# Patient Record
Sex: MALE | Race: WHITE | NOT HISPANIC OR LATINO | URBAN - METROPOLITAN AREA
[De-identification: names, ages, dates, MRNs, and addresses within clinical notes are randomized per-mention and may not be internally consistent; named-entity substitution may affect disease eponyms.]

---

## 2020-09-18 ENCOUNTER — EMERGENCY (EMERGENCY)
Facility: HOSPITAL | Age: 30
LOS: 0 days | Discharge: HOME | End: 2020-09-19
Attending: EMERGENCY MEDICINE | Admitting: EMERGENCY MEDICINE
Payer: COMMERCIAL

## 2020-09-18 VITALS
OXYGEN SATURATION: 98 % | RESPIRATION RATE: 18 BRPM | HEART RATE: 72 BPM | DIASTOLIC BLOOD PRESSURE: 83 MMHG | TEMPERATURE: 98 F | SYSTOLIC BLOOD PRESSURE: 142 MMHG

## 2020-09-18 DIAGNOSIS — R07.0 PAIN IN THROAT: ICD-10-CM

## 2020-09-18 DIAGNOSIS — H92.01 OTALGIA, RIGHT EAR: ICD-10-CM

## 2020-09-18 DIAGNOSIS — H92.09 OTALGIA, UNSPECIFIED EAR: ICD-10-CM

## 2020-09-18 DIAGNOSIS — F17.200 NICOTINE DEPENDENCE, UNSPECIFIED, UNCOMPLICATED: ICD-10-CM

## 2020-09-18 DIAGNOSIS — K08.89 OTHER SPECIFIED DISORDERS OF TEETH AND SUPPORTING STRUCTURES: ICD-10-CM

## 2020-09-18 DIAGNOSIS — R51 HEADACHE: ICD-10-CM

## 2020-09-18 DIAGNOSIS — M54.2 CERVICALGIA: ICD-10-CM

## 2020-09-18 LAB
ANION GAP SERPL CALC-SCNC: 12 MMOL/L — SIGNIFICANT CHANGE UP (ref 7–14)
BASOPHILS # BLD AUTO: 0.06 K/UL — SIGNIFICANT CHANGE UP (ref 0–0.2)
BASOPHILS NFR BLD AUTO: 0.9 % — SIGNIFICANT CHANGE UP (ref 0–1)
BUN SERPL-MCNC: 11 MG/DL — SIGNIFICANT CHANGE UP (ref 10–20)
CALCIUM SERPL-MCNC: 9.9 MG/DL — SIGNIFICANT CHANGE UP (ref 8.5–10.1)
CHLORIDE SERPL-SCNC: 96 MMOL/L — LOW (ref 98–110)
CO2 SERPL-SCNC: 27 MMOL/L — SIGNIFICANT CHANGE UP (ref 17–32)
CREAT SERPL-MCNC: 1 MG/DL — SIGNIFICANT CHANGE UP (ref 0.7–1.5)
EOSINOPHIL # BLD AUTO: 0.25 K/UL — SIGNIFICANT CHANGE UP (ref 0–0.7)
EOSINOPHIL NFR BLD AUTO: 3.9 % — SIGNIFICANT CHANGE UP (ref 0–8)
GLUCOSE SERPL-MCNC: 109 MG/DL — HIGH (ref 70–99)
HCT VFR BLD CALC: 46.8 % — SIGNIFICANT CHANGE UP (ref 42–52)
HGB BLD-MCNC: 15.7 G/DL — SIGNIFICANT CHANGE UP (ref 14–18)
IMM GRANULOCYTES NFR BLD AUTO: 0.3 % — SIGNIFICANT CHANGE UP (ref 0.1–0.3)
LYMPHOCYTES # BLD AUTO: 1.89 K/UL — SIGNIFICANT CHANGE UP (ref 1.2–3.4)
LYMPHOCYTES # BLD AUTO: 29.4 % — SIGNIFICANT CHANGE UP (ref 20.5–51.1)
MCHC RBC-ENTMCNC: 29.6 PG — SIGNIFICANT CHANGE UP (ref 27–31)
MCHC RBC-ENTMCNC: 33.5 G/DL — SIGNIFICANT CHANGE UP (ref 32–37)
MCV RBC AUTO: 88.1 FL — SIGNIFICANT CHANGE UP (ref 80–94)
MONOCYTES # BLD AUTO: 0.58 K/UL — SIGNIFICANT CHANGE UP (ref 0.1–0.6)
MONOCYTES NFR BLD AUTO: 9 % — SIGNIFICANT CHANGE UP (ref 1.7–9.3)
NEUTROPHILS # BLD AUTO: 3.63 K/UL — SIGNIFICANT CHANGE UP (ref 1.4–6.5)
NEUTROPHILS NFR BLD AUTO: 56.5 % — SIGNIFICANT CHANGE UP (ref 42.2–75.2)
NRBC # BLD: 0 /100 WBCS — SIGNIFICANT CHANGE UP (ref 0–0)
PLATELET # BLD AUTO: 286 K/UL — SIGNIFICANT CHANGE UP (ref 130–400)
POTASSIUM SERPL-MCNC: 3.7 MMOL/L — SIGNIFICANT CHANGE UP (ref 3.5–5)
POTASSIUM SERPL-SCNC: 3.7 MMOL/L — SIGNIFICANT CHANGE UP (ref 3.5–5)
RBC # BLD: 5.31 M/UL — SIGNIFICANT CHANGE UP (ref 4.7–6.1)
RBC # FLD: 12.2 % — SIGNIFICANT CHANGE UP (ref 11.5–14.5)
SODIUM SERPL-SCNC: 135 MMOL/L — SIGNIFICANT CHANGE UP (ref 135–146)
WBC # BLD: 6.43 K/UL — SIGNIFICANT CHANGE UP (ref 4.8–10.8)
WBC # FLD AUTO: 6.43 K/UL — SIGNIFICANT CHANGE UP (ref 4.8–10.8)

## 2020-09-18 PROCEDURE — 99285 EMERGENCY DEPT VISIT HI MDM: CPT

## 2020-09-18 PROCEDURE — 70491 CT SOFT TISSUE NECK W/DYE: CPT | Mod: 26

## 2020-09-18 RX ORDER — ACETAMINOPHEN 500 MG
975 TABLET ORAL ONCE
Refills: 0 | Status: COMPLETED | OUTPATIENT
Start: 2020-09-18 | End: 2020-09-18

## 2020-09-18 RX ADMIN — Medication 975 MILLIGRAM(S): at 20:14

## 2020-09-18 RX ADMIN — Medication 975 MILLIGRAM(S): at 20:45

## 2020-09-18 NOTE — ED PROVIDER NOTE - NSFOLLOWUPINSTRUCTIONS_ED_ALL_ED_FT
Earache    WHAT YOU NEED TO KNOW:    An earache can be caused by a problem within your ear or from another body area. Common causes include earwax buildup, objects in your ear, injury, infections, or jaw or dental problems. Less often, earaches may be caused by arthritis in your upper spine.    DISCHARGE INSTRUCTIONS:    Return to the emergency department if:     You have a severe earache.      You have ear pain with itching, hearing loss, dizziness, a feeling of fullness in your ear, or ringing in your ears.    Contact your healthcare provider if:     Your ear pain worsens or does not go away with treatment.      You have drainage from your ear.      You have a fever.       Your outer ear becomes red, swollen, and warm.      You have questions or concerns about your condition or care.    Medicines: You may need any of the following:     NSAIDs, such as ibuprofen, help decrease swelling, pain, and fever. This medicine is available with or without a doctor's order. NSAIDs can cause stomach bleeding or kidney problems in certain people. If you take blood thinner medicine, always ask if NSAIDs are safe for you. Always read the medicine label and follow directions. Do not give these medicines to children under 6 months of age without direction from your child's healthcare provider.      Acetaminophen decreases pain and fever. It is available without a doctor's order. Ask how much to take and how often to take it. Follow directions. Acetaminophen can cause liver damage if not taken correctly.      Do not give aspirin to children under 18 years of age. Your child could develop Reye syndrome if he takes aspirin. Reye syndrome can cause life-threatening brain and liver damage. Check your child's medicine labels for aspirin, salicylates, or oil of wintergreen.       Take your medicine as directed. Call your healthcare provider if you think your medicine is not helping or if you have side effects. Tell him if you are allergic to any medicine. Keep a list of the medicines, vitamins, and herbs you take. Include the amounts, and when and why you take them. Bring the list or the pill bottles to follow-up visits. Carry your medicine list with you in case of an emergency.    Follow up with your healthcare provider as directed: Write down your questions so you remember to ask them during your visits.       © Copyright [employer] 2019 All illustrations and images included in CareNotes are the copyrighted property of [employer] or [employer].   Dental Pain    Dental pain (toothache) may be caused by many things including tooth decay (cavities or caries), abscess or infection, injury, or the reason may be unknown. Your pain may only occur when you are chewing, are exposed to hot or cold temperature, are eating or drinking sugary foods or beverages, or your pain may be constant. If you were prescribed an antibiotic medicine, finish all of it even if you start to feel better. Rinsing your mouth with salt water or applying ice to the painful area of your face may help with the pain.    SEEK IMMEDIATE MEDICAL CARE IF YOU HAVE THE FOLLOWING SYMPTOMS: unable to open mouth, trouble breathing or swallowing, fever, or swelling of the face, neck or jaw.

## 2020-09-18 NOTE — ED PROVIDER NOTE - NSFOLLOWUPCLINICS_GEN_ALL_ED_FT
[facility]  Dental  [address] [place] [address]  Phone: [phone]  Fax:   Follow Up Time:     [facility]  ENT  [address], [address]nd [place] [address]  Phone: [phone]  Fax:   Follow Up Time:

## 2020-09-18 NOTE — ED PROVIDER NOTE - PROGRESS NOTE DETAILS
s/o from [place] at 2200 pending CT read; awaiting final read for several hrs despite mult calls to rad; finally attending called  and said no change, but final read performed by neurorad in AM  Pt counseled - warning si/sxs d/w pt. Pt instructed to return to ed or f/u with PCP should sxs persist/worsen. Pt verbalizes an understanding & agreement with the plan as discussed

## 2020-09-18 NOTE — ED PROVIDER NOTE - NS ED ROS FT
Eyes:  No visual changes, eye pain or discharge.  ENMT:  + throat pain, No hearing changes, pain, discharge or infections. No neck pain or stiffness.  Cardiac:  No chest pain, SOB or edema. No chest pain with exertion.  Respiratory:  No cough or respiratory distress. No hemoptysis. No history of asthma or RAD.  GI:  No nausea, vomiting, diarrhea or abdominal pain.  MS:  No myalgia, muscle weakness, joint pain or back pain.  Neuro:  No headache or weakness.  No LOC.  Skin:  No skin rash.   Endocrine: No history of thyroid disease or diabetes.  Except as documented in the HPI,  all other systems are negative.

## 2020-09-18 NOTE — ED PROVIDER NOTE - ATTENDING CONTRIBUTION TO CARE
29 yr old m w/ a pmh significant for smoker who presents today with neck pain. Pt states that the pain started about a week ago. Pt states that the pain is mostly in the R upper jaw neck area. Pt states that he has been taking amoxicillin (from previous dental work) without minimal report. Pt denies any fevers, chills, nausea, vomiting, or any other complaints.     Review of Systems    Constitutional: (-) fever  Cardiovascular: (-) chest pain, (-) syncope  Respiratory: (-) cough, (-) shortness of breath  Gastrointestinal: (-) vomiting, (-) diarrhea, (-) abdominal pain  Musculoskeletal: (-) neck pain, (-) back pain, (-) joint pain  Integumentary: (-) rash, (-) edema  Neurological: (-) headache, (-) altered mental status    Except as documented in the HPI, all other systems are negative.    VITAL SIGNS: I have reviewed nursing notes and confirm.  CONSTITUTIONAL: non-toxic, well appearing  SKIN: no rash, no petechiae.  EYES: PERRL, EOMI, pink conjunctiva, anicteric  ENT: tongue midline, no exudates, MMM. bilaterally TM no erythema or bulging. there is pain on palpation of the R jaw.   NECK: Supple; no meningismus, there is swelling noted to the R neck, however, no swollen lymphadenopathy noted on exam.   CARD: RRR, no murmurs, equal radial pulses bilaterally 2+  RESP: CTAB, no respiratory distress  ABD: Soft, non-tender, non-distended, no peritoneal signs, no HSM, no CVA tenderness  : no hernias, no lesions, no testicular swelling/tenderness  EXT: Normal ROM x4. No edema. No calves tenderness  NEURO: Alert, oriented. CN2-12 intact, equal strength bilaterally, nl gait.  PSYCH: Cooperative, appropriate.    a/p  29 yr old m that presents with R sided neck and jaw pain, concern for possible abscess due to pts history of poor dentation  -labs  -imaging  -pain   -dispo as per above

## 2020-09-18 NOTE — ED ADULT NURSE NOTE - CAS EDN DISCHARGE ASSESSMENT
Problem: Infection  Goal: Will remain free from infection  Scheduled PO abx's given - see MAR     Problem: Venous Thromboembolism (VTW)/Deep Vein Thrombosis (DVT) Prevention:  Goal: Patient will participate in Venous Thrombosis (VTE)/Deep Vein Thrombosis (DVT)Prevention Measures    05/31/17 1930   OTHER   Risk Assessment Score 3   VTE RISK High   Pharmacologic Prophylaxis Used (PLAVIX)             
Problem: Pain Management  Goal: Pain level will decrease to patient’s comfort goal  Intervention: Educate and implement non-pharmacologic comfort measures. Examples: relaxation, distration, play therapy, activity therapy, massage, etc.  Pain tolerable,encourage alternative techniques to relive pain  Minimizing use of narcotics          
Problem: Safety  Goal: Will remain free from injury  Outcome: PROGRESSING AS EXPECTED  Bed in low position, treaded slipper socks on, and call light in reach. Pt instructed to call nurse if ambulating out of bed.     Problem: Knowledge Deficit  Goal: Knowledge of disease process/condition, treatment plan, diagnostic tests, and medications will improve  Outcome: PROGRESSING AS EXPECTED  Pt updated on POC (EGD in AM, lab results, medications, diet order, etc.). Pt and family verbalize understanding.         
Problem: Venous Thromboembolism (VTW)/Deep Vein Thrombosis (DVT) Prevention:  Goal: Patient will participate in Venous Thrombosis (VTE)/Deep Vein Thrombosis (DVT)Prevention Measures    05/30/17 2034   OTHER   Risk Assessment Score 3   VTE RISK Moderate   Pharmacologic Prophylaxis Used LMWH: Enoxaparin(Lovenox)         Problem: Pain Management  Goal: Pain level will decrease to patient’s comfort goal  PRN Oxy IR & dilaudid given - see MAR         
Alert and oriented to person, place and time

## 2020-09-18 NOTE — ED PROVIDER NOTE - OBJECTIVE STATEMENT
Pt is a 28y/o male presents today for eval of right sided neck pain x 1 week that has been constant, worsened with movement. Pt has been taking leftover abx from old dental infection in hopes it would relieve pain, but it hasn't. Pt denies fever, chills, dysphagia, n/v/d, CP, SOB, Abd pain, drooling.

## 2020-09-18 NOTE — ED PROVIDER NOTE - PHYSICAL EXAMINATION
VITAL SIGNS: I have reviewed nursing notes and confirm.  CONSTITUTIONAL: Well-developed; well-nourished; in no acute distress.   SKIN: skin exam is warm and dry, no acute rash.    HEAD: Normocephalic; atraumatic.  EYES:  conjunctiva and sclera clear.  ENT: no sublingual edema, No nasal discharge; airway clear.  NECK:TTP right submandibular region, no palpable nodes/edema Supple; non tender.  CARD: S1, S2 normal; no murmurs, gallops, or rubs. Regular rate and rhythm.   RESP: No wheezes, rales or rhonchi.  EXT: Normal ROM.  No clubbing, cyanosis or edema.   NEURO: Alert, oriented, grossly unremarkable

## 2020-09-18 NOTE — ED PROVIDER NOTE - PATIENT PORTAL LINK FT
You can access the FollowMyHealth Patient Portal offered by [facility] by registering at the following website: [email or web address]. By joining [employer]’s FollowMyHealth portal, you will also be able to view your health information using other applications (apps) compatible with our system.

## 2020-09-19 RX ORDER — IBUPROFEN 200 MG
800 TABLET ORAL ONCE
Refills: 0 | Status: DISCONTINUED | OUTPATIENT
Start: 2020-09-19 | End: 2020-09-19

## 2020-09-19 RX ORDER — IBUPROFEN 200 MG
1 TABLET ORAL
Qty: 21 | Refills: 0
Start: 2020-09-19 | End: 2020-09-25

## 2020-09-19 RX ORDER — AMOXICILLIN 250 MG/5ML
1 SUSPENSION, RECONSTITUTED, ORAL (ML) ORAL
Qty: 20 | Refills: 0
Start: 2020-09-19 | End: 2020-09-28

## 2022-04-06 NOTE — ED ADULT TRIAGE NOTE - BP NONINVASIVE DIASTOLIC (MM HG)
Pt daughter calling saying pt had uti sx. Has bloating and dysuria. Pt taking keflex 250mg every other day. Requesting abx tx for sx. TORB Dr Jodi Tobars macrobid 100mg po BID x 7d. 83

## 2025-01-14 ENCOUNTER — EMERGENCY (EMERGENCY)
Facility: HOSPITAL | Age: 35
LOS: 0 days | Discharge: HOME | End: 2025-01-14
Attending: EMERGENCY MEDICINE
Payer: COMMERCIAL

## 2025-01-14 VITALS
HEART RATE: 85 BPM | HEIGHT: 70 IN | WEIGHT: 205.03 LBS | RESPIRATION RATE: 16 BRPM | OXYGEN SATURATION: 99 % | SYSTOLIC BLOOD PRESSURE: 123 MMHG | DIASTOLIC BLOOD PRESSURE: 76 MMHG | TEMPERATURE: 100 F

## 2025-01-14 PROCEDURE — 99284 EMERGENCY DEPT VISIT MOD MDM: CPT

## 2025-01-14 PROCEDURE — 0241U: CPT

## 2025-01-14 PROCEDURE — 99283 EMERGENCY DEPT VISIT LOW MDM: CPT | Mod: 25

## 2025-01-14 PROCEDURE — 71046 X-RAY EXAM CHEST 2 VIEWS: CPT | Mod: 26

## 2025-01-14 PROCEDURE — 71046 X-RAY EXAM CHEST 2 VIEWS: CPT

## 2025-01-14 RX ORDER — IBUPROFEN 200 MG
600 TABLET ORAL ONCE
Refills: 0 | Status: COMPLETED | OUTPATIENT
Start: 2025-01-14 | End: 2025-01-14

## 2025-01-14 RX ORDER — ONDANSETRON 4 MG/1
1 TABLET ORAL
Qty: 1 | Refills: 0
Start: 2025-01-14 | End: 2025-01-16

## 2025-01-14 RX ORDER — ONDANSETRON 4 MG/1
4 TABLET ORAL ONCE
Refills: 0 | Status: COMPLETED | OUTPATIENT
Start: 2025-01-14 | End: 2025-01-14

## 2025-01-14 RX ADMIN — Medication 600 MILLIGRAM(S): at 18:34

## 2025-01-14 RX ADMIN — ONDANSETRON 4 MILLIGRAM(S): 4 TABLET ORAL at 19:44

## 2025-01-14 NOTE — ED PROVIDER NOTE - PATIENT PORTAL LINK FT
You can access the FollowMyHealth Patient Portal offered by [facility] by registering at the following website: [email or web address]. By joining [employer]’s FollowMyHealth portal, you will also be able to view your health information using other applications (apps) compatible with our system.

## 2025-01-14 NOTE — ED PROVIDER NOTE - CLINICAL SUMMARY MEDICAL DECISION MAKING FREE TEXT BOX
33-year-old man, no significant past medical history complains of generalized bodyaches, chills, cough that began today.  Has not taken any meds for this as symptoms just began today.  Vital signs, exam as noted, patient is nontoxic-appearing, afebrile.  Throat clear, lungs clear, CV S1-S2, abdomen soft, nontender.  Suspect viral illness.  Swab pending.  Recommended fluids, OTC meds, follow-up PMD.

## 2025-01-14 NOTE — ED PROVIDER NOTE - OBJECTIVE STATEMENT
34-year-old male with no significant history presents for evaluation of flulike symptoms.  Patient started getting symptoms of body pains today while at work.  He endorses chills starting, cough, but denies any fevers, sweats, sore throat, shortness of breath, chest pain, diarrhea, or urinary symptoms.  He has not taken anything for this yet.  Patient endorses nausea, but denies vomiting.

## 2025-01-14 NOTE — ED PROVIDER NOTE - PHYSICAL EXAMINATION
CONSTITUTIONAL: well-appearing, well nourished, non-toxic, NAD  HEAD: NCAT  EYES: EOMI, no scleral icterus  ENT: Moist mucous membranes, normal pharynx with no erythema or exudates  NECK: Full ROM.   CARD: RRR  RESP: clear to ausculation b/l  ABD: soft, non-tender  EXT: Full ROM  NEURO: normal motor. normal sensory. Normal gait.  PSYCH: Cooperative, appropriate.

## 2025-01-15 LAB
FLUAV AG NPH QL: SIGNIFICANT CHANGE UP
FLUBV AG NPH QL: SIGNIFICANT CHANGE UP
RSV RNA NPH QL NAA+NON-PROBE: SIGNIFICANT CHANGE UP
SARS-COV-2 RNA SPEC QL NAA+PROBE: SIGNIFICANT CHANGE UP

## 2025-05-08 ENCOUNTER — EMERGENCY (EMERGENCY)
Facility: HOSPITAL | Age: 35
LOS: 0 days | Discharge: HOME | End: 2025-05-08
Attending: EMERGENCY MEDICINE
Payer: COMMERCIAL

## 2025-05-08 VITALS
OXYGEN SATURATION: 96 % | DIASTOLIC BLOOD PRESSURE: 84 MMHG | HEART RATE: 93 BPM | HEIGHT: 70 IN | RESPIRATION RATE: 16 BRPM | TEMPERATURE: 98 F | WEIGHT: 190.04 LBS | SYSTOLIC BLOOD PRESSURE: 141 MMHG

## 2025-05-08 DIAGNOSIS — N50.819 TESTICULAR PAIN, UNSPECIFIED: ICD-10-CM

## 2025-05-08 DIAGNOSIS — R10.30 LOWER ABDOMINAL PAIN, UNSPECIFIED: ICD-10-CM

## 2025-05-08 DIAGNOSIS — F17.210 NICOTINE DEPENDENCE, CIGARETTES, UNCOMPLICATED: ICD-10-CM

## 2025-05-08 DIAGNOSIS — N49.2 INFLAMMATORY DISORDERS OF SCROTUM: ICD-10-CM

## 2025-05-08 DIAGNOSIS — N45.1 EPIDIDYMITIS: ICD-10-CM

## 2025-05-08 LAB
APPEARANCE UR: CLEAR — SIGNIFICANT CHANGE UP
BILIRUB UR-MCNC: NEGATIVE — SIGNIFICANT CHANGE UP
COLOR SPEC: YELLOW — SIGNIFICANT CHANGE UP
DIFF PNL FLD: NEGATIVE — SIGNIFICANT CHANGE UP
GLUCOSE UR QL: NEGATIVE MG/DL — SIGNIFICANT CHANGE UP
HIV 1 & 2 AB SERPL IA.RAPID: SIGNIFICANT CHANGE UP
KETONES UR-MCNC: NEGATIVE MG/DL — SIGNIFICANT CHANGE UP
LEUKOCYTE ESTERASE UR-ACNC: ABNORMAL
NITRITE UR-MCNC: NEGATIVE — SIGNIFICANT CHANGE UP
PH UR: 6 — SIGNIFICANT CHANGE UP (ref 5–8)
PROT UR-MCNC: NEGATIVE MG/DL — SIGNIFICANT CHANGE UP
SP GR SPEC: 1.01 — SIGNIFICANT CHANGE UP (ref 1–1.03)
SPECIMEN SOURCE: SIGNIFICANT CHANGE UP
UROBILINOGEN FLD QL: 0.2 MG/DL — SIGNIFICANT CHANGE UP (ref 0.2–1)

## 2025-05-08 PROCEDURE — 96372 THER/PROPH/DIAG INJ SC/IM: CPT

## 2025-05-08 PROCEDURE — 76870 US EXAM SCROTUM: CPT | Mod: 26

## 2025-05-08 PROCEDURE — 87491 CHLMYD TRACH DNA AMP PROBE: CPT

## 2025-05-08 PROCEDURE — 81001 URINALYSIS AUTO W/SCOPE: CPT

## 2025-05-08 PROCEDURE — 76870 US EXAM SCROTUM: CPT

## 2025-05-08 PROCEDURE — 36415 COLL VENOUS BLD VENIPUNCTURE: CPT

## 2025-05-08 PROCEDURE — 87591 N.GONORRHOEAE DNA AMP PROB: CPT

## 2025-05-08 PROCEDURE — 86703 HIV-1/HIV-2 1 RESULT ANTBDY: CPT

## 2025-05-08 PROCEDURE — 99284 EMERGENCY DEPT VISIT MOD MDM: CPT

## 2025-05-08 PROCEDURE — 99284 EMERGENCY DEPT VISIT MOD MDM: CPT | Mod: 25

## 2025-05-08 PROCEDURE — 86780 TREPONEMA PALLIDUM: CPT

## 2025-05-08 RX ORDER — KETOROLAC TROMETHAMINE 30 MG/ML
30 INJECTION, SOLUTION INTRAMUSCULAR; INTRAVENOUS ONCE
Refills: 0 | Status: DISCONTINUED | OUTPATIENT
Start: 2025-05-08 | End: 2025-05-08

## 2025-05-08 RX ORDER — NALTREXONE HYDROCHLORIDE 50 MG/1
1 TABLET, FILM COATED ORAL
Qty: 30 | Refills: 0
Start: 2025-05-08 | End: 2025-06-06

## 2025-05-08 RX ADMIN — KETOROLAC TROMETHAMINE 30 MILLIGRAM(S): 30 INJECTION, SOLUTION INTRAMUSCULAR; INTRAVENOUS at 12:55

## 2025-05-08 NOTE — ED PROVIDER NOTE - NSFOLLOWUPINSTRUCTIONS_ED_ALL_ED_FT
Thank you for letting us take care of you today.  Return to the Emergency Department if your symptoms worsen, or if any problems.    Continue taking the doxycycline that you were prescribed yesterday.    Take naltrexone as prescribed.  One of our Health Coaches will also call you to see how you are doing with the naltrexone (given a 30 day supply).    Take motrin or advil for your pain also.  You can also add Tylenol for additional pain relief.    Your diagnosis is: Epididymo-Orchitis / Epididymitis    AMBULATORY CARE:    Epididymo-orchitis is inflammation of your epididymis and testicle. The epididymis is a coiled tube inside your scrotum. It stores and carries sperm from your testicles to your penis. Epididymo-orchitis usually affects the epididymis and testicle on one side, but it may affect both sides.  Male Reproductive System    What are the signs and symptoms of epididymo-orchitis?    Pain or tenderness in your scrotum, abdomen, or groin    Redness or swelling of your scrotum    Pain or burning during urination, or frequent urination    Discharge from your penis or blood in your urine or semen    Fever  Seek care immediately if:    You have severe pain in your testicles.    Your symptoms become worse even after you start treatment with medicine.  Contact your healthcare provider if:    Your symptoms do not get better within 3 days of treatment or come back after treatment.    You have a hot, red, tender area on your testicles.    You have questions or concerns about your condition or care.  Treatment depends on the cause of your epididymo-orchitis and may include any of the following:    Antibiotics help treat a bacterial infection.    NSAIDs, such as ibuprofen, help decrease swelling, pain, and fever. This medicine is available with or without a doctor's order. NSAIDs can cause stomach bleeding or kidney problems in certain people. If you take blood thinner medicine, always ask if NSAIDs are safe for you. Always read the medicine label and follow directions. Do not give these medicines to children younger than 6 months without direction from a healthcare provider.    Acetaminophen decreases pain and fever. It is available without a doctor's order. Ask how much to take and how often to take it. Follow directions. Read the labels of all other medicines you are using to see if they also contain acetaminophen, or ask your doctor or pharmacist. Acetaminophen can cause liver damage if not taken correctly.    Prescription pain medicine may be given. Ask your healthcare provider how to take this medicine safely. Some prescription pain medicines contain acetaminophen. Do not take other medicines that contain acetaminophen without talking to your healthcare provider. Too much acetaminophen may cause liver damage. Prescription pain medicine may cause constipation. Ask your healthcare provider how to prevent or treat constipation.    Surgery may be needed if your condition gets worse or becomes chronic. Surgery to drain an abscess (collection of pus) may be needed. Surgery to remove part or all of your epididymis or testicle may also be done.  Manage epididymo-orchitis:    Apply ice on your testicles for 15 to 20 minutes every hour or as directed. Use an ice pack, or put crushed ice in a plastic bag. Cover it with a towel. Ice helps prevent tissue damage and decreases swelling and pain.    Rest in bed as directed. Elevate your scrotum when you sit or lie down to help reduce swelling and pain. You may be asked to do this by placing a rolled-up towel under your scrotum.    Scrotal support may be recommended. An athletic supporter provides scrotal support and may make you more comfortable when you stand. Ask your healthcare provider how to use an athletic supporter.    Do not lift heavy objects. You can make swelling worse if you lift heavy objects or strain.  Follow up with your doctor as directed: Write down your questions so you remember to ask them during your visits. Thank you for letting us take care of you today.  Return to the Emergency Department if your symptoms worsen, or if any problems.    Continue taking the doxycycline that you were prescribed yesterday.  Make sure that Urgent Care gave you a 10 day supply of doxycycline.    Our Referral Manager will call you later this week to give you an appointment with our Urologist so you can be re-evaluated.    Take naltrexone as prescribed.  One of our Health Coaches will also call you to see how you are doing with the naltrexone (given a 30 day supply).    Take motrin or advil for your pain also.  You can also add Tylenol for additional pain relief.    Your diagnosis is: Epididymo-Orchitis / Epididymitis    AMBULATORY CARE:    Epididymo-orchitis is inflammation of your epididymis and testicle. The epididymis is a coiled tube inside your scrotum. It stores and carries sperm from your testicles to your penis. Epididymo-orchitis usually affects the epididymis and testicle on one side, but it may affect both sides.  Male Reproductive System    What are the signs and symptoms of epididymo-orchitis?    Pain or tenderness in your scrotum, abdomen, or groin    Redness or swelling of your scrotum    Pain or burning during urination, or frequent urination    Discharge from your penis or blood in your urine or semen    Fever  Seek care immediately if:    You have severe pain in your testicles.    Your symptoms become worse even after you start treatment with medicine.  Contact your healthcare provider if:    Your symptoms do not get better within 3 days of treatment or come back after treatment.    You have a hot, red, tender area on your testicles.    You have questions or concerns about your condition or care.  Treatment depends on the cause of your epididymo-orchitis and may include any of the following:    Antibiotics help treat a bacterial infection.    NSAIDs, such as ibuprofen, help decrease swelling, pain, and fever. This medicine is available with or without a doctor's order. NSAIDs can cause stomach bleeding or kidney problems in certain people. If you take blood thinner medicine, always ask if NSAIDs are safe for you. Always read the medicine label and follow directions. Do not give these medicines to children younger than 6 months without direction from a healthcare provider.    Acetaminophen decreases pain and fever. It is available without a doctor's order. Ask how much to take and how often to take it. Follow directions. Read the labels of all other medicines you are using to see if they also contain acetaminophen, or ask your doctor or pharmacist. Acetaminophen can cause liver damage if not taken correctly.    Prescription pain medicine may be given. Ask your healthcare provider how to take this medicine safely. Some prescription pain medicines contain acetaminophen. Do not take other medicines that contain acetaminophen without talking to your healthcare provider. Too much acetaminophen may cause liver damage. Prescription pain medicine may cause constipation. Ask your healthcare provider how to prevent or treat constipation.    Surgery may be needed if your condition gets worse or becomes chronic. Surgery to drain an abscess (collection of pus) may be needed. Surgery to remove part or all of your epididymis or testicle may also be done.  Manage epididymo-orchitis:    Apply ice on your testicles for 15 to 20 minutes every hour or as directed. Use an ice pack, or put crushed ice in a plastic bag. Cover it with a towel. Ice helps prevent tissue damage and decreases swelling and pain.    Rest in bed as directed. Elevate your scrotum when you sit or lie down to help reduce swelling and pain. You may be asked to do this by placing a rolled-up towel under your scrotum.    Scrotal support may be recommended. An athletic supporter provides scrotal support and may make you more comfortable when you stand. Ask your healthcare provider how to use an athletic supporter.    Do not lift heavy objects. You can make swelling worse if you lift heavy objects or strain.  Follow up with your doctor as directed: Write down your questions so you remember to ask them during your visits.

## 2025-05-08 NOTE — ED PROVIDER NOTE - OBJECTIVE STATEMENT
Patient is a 34-year-old male who is complaining of testicular swelling and lower abdominal pain since 10 AM yesterday.  [facility] and was referred for an outpatient ultrasound.  The patient states that his testicles are doubled the size and that they are firm.  Yesterday [facility] treated this as a potential sexually transmitted infection and he received ceftriaxone and was also prescribed oral doxycycline.  He has taken 2 doses of doxycycline.  He also drinks alcohol daily and uses cocaine when he drinks.  Last alcohol and cocaine use was yesterday.  He is sexually active with multiple partners but wears condoms when he has intercourse.  He engages in vaginal intercourse.  No anal intercourse.  There is no discharge from the penis.  Yesterday he did have some dysuria, but the dysuria has now resolved.  He does have a desire to drink less alcohol.  He only uses cocaine if he drinks alcohol.  He has been on Vivitrol in the past.    PMH: UTI, AUD, Cocaine Use Disorder  Meds:  Doxycycline  Allergies: NKDA

## 2025-05-08 NOTE — ED PROVIDER NOTE - PATIENT PORTAL LINK FT
You can access the FollowMyHealth Patient Portal offered by [facility] by registering at the following website: [email or web address]. By joining [employer]’s FollowMyHealth portal, you will also be able to view your health information using other applications (apps) compatible with our system.

## 2025-05-08 NOTE — ED ADULT NURSE NOTE - NSFALLUNIVINTERV_ED_ALL_ED
Bed/Stretcher in lowest position, wheels locked, appropriate side rails in place/Call bell, personal items and telephone in reach/Instruct patient to call for assistance before getting out of bed/chair/stretcher/Non-slip footwear applied when patient is off stretcher/Downs to call system/Physically safe environment - no spills, clutter or unnecessary equipment/Purposeful proactive rounding/Room/bathroom lighting operational, light cord in reach

## 2025-05-08 NOTE — ED PROVIDER NOTE - CLINICAL SUMMARY MEDICAL DECISION MAKING FREE TEXT BOX
Patient with low abd pain and bilateral testicle swelling since yesterday  Treated for STI yesterday at Urgent Care (and dysuria has now resolved)  Pt is concerned and came to ED  I suspect epididymitis  Will get US and run STI tests. Patient with low abd pain and bilateral testicle swelling since yesterday  Treated for STI yesterday at Urgent Care (and dysuria has now resolved)  Pt is concerned and came to ED  I suspect epididymitis  Will get US and run STI tests.  US shows epipydidymitis  Referral given to

## 2025-05-08 NOTE — ED PROVIDER NOTE - PHYSICAL EXAMINATION
General: Patient is A&O x 3 in NAD  Head: NC/AT  Eyes: EOMI, no lid lag, no proptosis  Ears: Normal  Nose: Normal  Neck: Normal ROM  Lungs: Normal respiratory effort  Heart: Normal rate, no peripheral edema  Abdomen:  Soft, mild suprapubic tenderness  :  Mild testicular swelling and tenderenss R>L, no penile discharge, normal lie testes bilaterally  Neuro: Gait normal, nonfocal  Skin: No rash, lesions or ulcers  Psych: Normal affect and behavior, intact judgement and insight

## 2025-05-09 PROBLEM — Z87.440 PERSONAL HISTORY OF URINARY (TRACT) INFECTIONS: Chronic | Status: ACTIVE | Noted: 2025-05-08

## 2025-05-09 PROBLEM — Z87.898 PERSONAL HISTORY OF OTHER SPECIFIED CONDITIONS: Chronic | Status: ACTIVE | Noted: 2025-05-08

## 2025-05-09 LAB
C TRACH RRNA SPEC QL NAA+PROBE: SIGNIFICANT CHANGE UP
N GONORRHOEA RRNA SPEC QL NAA+PROBE: SIGNIFICANT CHANGE UP
T PALLIDUM AB TITR SER: NEGATIVE — SIGNIFICANT CHANGE UP

## 2025-05-13 NOTE — CHART NOTE - NSCHARTNOTEFT_GEN_A_CORE
"[facility] MRN [id number] / Left message 5/9 - JL / pt called in, wants appt, sent to uro chat 5/9 - DK/ as per Esther, maru  5/12- AC / Appointment made - SHANITA / [address] / [place]	/ 	Fri 05/16/2025 11:15 AM"    SPECIALTY: urology

## 2025-05-16 ENCOUNTER — APPOINTMENT (OUTPATIENT)
Dept: UROLOGY | Facility: CLINIC | Age: 35
End: 2025-05-16